# Patient Record
Sex: FEMALE | Race: WHITE | NOT HISPANIC OR LATINO | URBAN - NONMETROPOLITAN AREA
[De-identification: names, ages, dates, MRNs, and addresses within clinical notes are randomized per-mention and may not be internally consistent; named-entity substitution may affect disease eponyms.]

---

## 2017-05-21 ENCOUNTER — HOSPITAL ENCOUNTER (EMERGENCY)
Facility: HOSPITAL | Age: 27
Discharge: LEFT BEFORE DISCHARGE | End: 2017-05-21
Attending: EMERGENCY MEDICINE | Admitting: EMERGENCY MEDICINE

## 2017-05-21 ENCOUNTER — APPOINTMENT (OUTPATIENT)
Dept: GENERAL RADIOLOGY | Facility: HOSPITAL | Age: 27
End: 2017-05-21

## 2017-05-21 VITALS
HEART RATE: 112 BPM | HEIGHT: 63 IN | WEIGHT: 100 LBS | OXYGEN SATURATION: 100 % | DIASTOLIC BLOOD PRESSURE: 81 MMHG | SYSTOLIC BLOOD PRESSURE: 116 MMHG | TEMPERATURE: 97.7 F | BODY MASS INDEX: 17.72 KG/M2 | RESPIRATION RATE: 24 BRPM

## 2017-05-21 DIAGNOSIS — R07.9 CHEST PAIN, UNSPECIFIED TYPE: Primary | ICD-10-CM

## 2017-05-21 LAB
ALBUMIN SERPL-MCNC: 4.6 G/DL (ref 3.5–5)
ALBUMIN/GLOB SERPL: 1.6 G/DL (ref 1.5–2.5)
ALP SERPL-CCNC: 56 U/L (ref 35–104)
ALT SERPL W P-5'-P-CCNC: 15 U/L (ref 10–36)
ANION GAP SERPL CALCULATED.3IONS-SCNC: 9.5 MMOL/L (ref 3.6–11.2)
AST SERPL-CCNC: 24 U/L (ref 10–30)
BASOPHILS # BLD AUTO: 0.03 10*3/MM3 (ref 0–0.3)
BASOPHILS NFR BLD AUTO: 0.6 % (ref 0–2)
BILIRUB SERPL-MCNC: 0.8 MG/DL (ref 0.2–1.8)
BUN BLD-MCNC: 11 MG/DL (ref 7–21)
BUN/CREAT SERPL: 14.3 (ref 7–25)
CALCIUM SPEC-SCNC: 9.5 MG/DL (ref 7.7–10)
CHLORIDE SERPL-SCNC: 109 MMOL/L (ref 99–112)
CO2 SERPL-SCNC: 24.5 MMOL/L (ref 24.3–31.9)
CREAT BLD-MCNC: 0.77 MG/DL (ref 0.43–1.29)
D DIMER PPP FEU-MCNC: 0.51 MG/L (FEU) (ref 0–0.5)
DEPRECATED RDW RBC AUTO: 39.4 FL (ref 37–54)
EOSINOPHIL # BLD AUTO: 0.06 10*3/MM3 (ref 0–0.7)
EOSINOPHIL NFR BLD AUTO: 1.2 % (ref 0–5)
ERYTHROCYTE [DISTWIDTH] IN BLOOD BY AUTOMATED COUNT: 13.4 % (ref 11.5–14.5)
GFR SERPL CREATININE-BSD FRML MDRD: 90 ML/MIN/1.73
GLOBULIN UR ELPH-MCNC: 2.8 GM/DL
GLUCOSE BLD-MCNC: 103 MG/DL (ref 70–110)
HCT VFR BLD AUTO: 38.6 % (ref 37–47)
HGB BLD-MCNC: 13.3 G/DL (ref 12–16)
HOLD SPECIMEN: NORMAL
HOLD SPECIMEN: NORMAL
IMM GRANULOCYTES # BLD: 0.01 10*3/MM3 (ref 0–0.03)
IMM GRANULOCYTES NFR BLD: 0.2 % (ref 0–0.5)
INR PPP: 1.03 (ref 0.8–1.1)
LYMPHOCYTES # BLD AUTO: 1.74 10*3/MM3 (ref 1–3)
LYMPHOCYTES NFR BLD AUTO: 33.5 % (ref 21–51)
MCH RBC QN AUTO: 28.5 PG (ref 27–33)
MCHC RBC AUTO-ENTMCNC: 34.5 G/DL (ref 33–37)
MCV RBC AUTO: 82.8 FL (ref 80–94)
MONOCYTES # BLD AUTO: 0.47 10*3/MM3 (ref 0.1–0.9)
MONOCYTES NFR BLD AUTO: 9.1 % (ref 0–10)
NEUTROPHILS # BLD AUTO: 2.88 10*3/MM3 (ref 1.4–6.5)
NEUTROPHILS NFR BLD AUTO: 55.4 % (ref 30–70)
OSMOLALITY SERPL CALC.SUM OF ELEC: 284.6 MOSM/KG (ref 273–305)
PLATELET # BLD AUTO: 269 10*3/MM3 (ref 130–400)
PMV BLD AUTO: 9.7 FL (ref 6–10)
POTASSIUM BLD-SCNC: 3.6 MMOL/L (ref 3.5–5.3)
PROT SERPL-MCNC: 7.4 G/DL (ref 6–8)
PROTHROMBIN TIME: 11.4 SECONDS (ref 9.8–11.9)
RBC # BLD AUTO: 4.66 10*6/MM3 (ref 4.2–5.4)
SODIUM BLD-SCNC: 143 MMOL/L (ref 135–153)
TROPONIN I SERPL-MCNC: <0.006 NG/ML
WBC NRBC COR # BLD: 5.19 10*3/MM3 (ref 4.5–12.5)
WHOLE BLOOD HOLD SPECIMEN: NORMAL
WHOLE BLOOD HOLD SPECIMEN: NORMAL

## 2017-05-21 PROCEDURE — 80053 COMPREHEN METABOLIC PANEL: CPT | Performed by: PHYSICIAN ASSISTANT

## 2017-05-21 PROCEDURE — 71020 HC CHEST PA AND LATERAL: CPT

## 2017-05-21 PROCEDURE — 84484 ASSAY OF TROPONIN QUANT: CPT | Performed by: PHYSICIAN ASSISTANT

## 2017-05-21 PROCEDURE — 99284 EMERGENCY DEPT VISIT MOD MDM: CPT

## 2017-05-21 PROCEDURE — 85379 FIBRIN DEGRADATION QUANT: CPT | Performed by: PHYSICIAN ASSISTANT

## 2017-05-21 PROCEDURE — 93010 ELECTROCARDIOGRAM REPORT: CPT | Performed by: INTERNAL MEDICINE

## 2017-05-21 PROCEDURE — 85025 COMPLETE CBC W/AUTO DIFF WBC: CPT | Performed by: PHYSICIAN ASSISTANT

## 2017-05-21 PROCEDURE — 71020 XR CHEST 2 VW: CPT | Performed by: RADIOLOGY

## 2017-05-21 PROCEDURE — 85610 PROTHROMBIN TIME: CPT | Performed by: PHYSICIAN ASSISTANT

## 2017-05-21 PROCEDURE — 93005 ELECTROCARDIOGRAM TRACING: CPT | Performed by: PHYSICIAN ASSISTANT

## 2017-05-21 RX ORDER — LORAZEPAM 2 MG/ML
1 INJECTION INTRAMUSCULAR ONCE
Status: DISCONTINUED | OUTPATIENT
Start: 2017-05-21 | End: 2017-05-21 | Stop reason: HOSPADM

## 2017-05-21 RX ORDER — ASPIRIN 325 MG
325 TABLET ORAL ONCE
Status: COMPLETED | OUTPATIENT
Start: 2017-05-21 | End: 2017-05-21

## 2017-05-21 RX ORDER — SODIUM CHLORIDE 0.9 % (FLUSH) 0.9 %
10 SYRINGE (ML) INJECTION AS NEEDED
Status: DISCONTINUED | OUTPATIENT
Start: 2017-05-21 | End: 2017-05-21 | Stop reason: HOSPADM

## 2017-05-21 RX ORDER — PAROXETINE HYDROCHLORIDE 20 MG/1
20 TABLET, FILM COATED ORAL EVERY MORNING
COMMUNITY

## 2017-05-21 RX ADMIN — ASPIRIN 325 MG: 325 TABLET ORAL at 10:53

## 2017-06-08 ENCOUNTER — HOSPITAL ENCOUNTER (EMERGENCY)
Facility: HOSPITAL | Age: 27
Discharge: HOME | End: 2017-06-09
Attending: EMERGENCY MEDICINE | Admitting: EMERGENCY MEDICINE

## 2017-06-08 ENCOUNTER — APPOINTMENT (OUTPATIENT)
Dept: GENERAL RADIOLOGY | Facility: HOSPITAL | Age: 27
End: 2017-06-08

## 2017-06-08 DIAGNOSIS — R07.9 CHEST PAIN, UNSPECIFIED TYPE: Primary | ICD-10-CM

## 2017-06-08 LAB
6-ACETYL MORPHINE: NEGATIVE
ALBUMIN SERPL-MCNC: 4.4 G/DL (ref 3.5–5)
ALBUMIN/GLOB SERPL: 1.5 G/DL (ref 1.5–2.5)
ALP SERPL-CCNC: 56 U/L (ref 35–104)
ALT SERPL W P-5'-P-CCNC: 19 U/L (ref 10–36)
AMPHET+METHAMPHET UR QL: POSITIVE
ANION GAP SERPL CALCULATED.3IONS-SCNC: 4.7 MMOL/L (ref 3.6–11.2)
AST SERPL-CCNC: 23 U/L (ref 10–30)
B-HCG UR QL: NEGATIVE
BACTERIA UR QL AUTO: ABNORMAL /HPF
BARBITURATES UR QL SCN: NEGATIVE
BASOPHILS # BLD AUTO: 0.02 10*3/MM3 (ref 0–0.3)
BASOPHILS NFR BLD AUTO: 0.4 % (ref 0–2)
BENZODIAZ UR QL SCN: NEGATIVE
BILIRUB SERPL-MCNC: 1 MG/DL (ref 0.2–1.8)
BILIRUB UR QL STRIP: NEGATIVE
BUN BLD-MCNC: 12 MG/DL (ref 7–21)
BUN/CREAT SERPL: 13.8 (ref 7–25)
BUPRENORPHINE SERPL-MCNC: NEGATIVE NG/ML
CALCIUM SPEC-SCNC: 9.8 MG/DL (ref 7.7–10)
CANNABINOIDS SERPL QL: NEGATIVE
CHLORIDE SERPL-SCNC: 109 MMOL/L (ref 99–112)
CK MB SERPL-CCNC: 2.29 NG/ML (ref 0–5)
CK MB SERPL-RTO: 1.6 % (ref 0–3)
CK SERPL-CCNC: 145 U/L (ref 24–173)
CLARITY UR: CLEAR
CO2 SERPL-SCNC: 25.3 MMOL/L (ref 24.3–31.9)
COCAINE UR QL: NEGATIVE
COLOR UR: YELLOW
CREAT BLD-MCNC: 0.87 MG/DL (ref 0.43–1.29)
DEPRECATED RDW RBC AUTO: 40.5 FL (ref 37–54)
EOSINOPHIL # BLD AUTO: 0.06 10*3/MM3 (ref 0–0.7)
EOSINOPHIL NFR BLD AUTO: 1.1 % (ref 0–5)
ERYTHROCYTE [DISTWIDTH] IN BLOOD BY AUTOMATED COUNT: 13.3 % (ref 11.5–14.5)
GFR SERPL CREATININE-BSD FRML MDRD: 78 ML/MIN/1.73
GLOBULIN UR ELPH-MCNC: 2.9 GM/DL
GLUCOSE BLD-MCNC: 95 MG/DL (ref 70–110)
GLUCOSE UR STRIP-MCNC: NEGATIVE MG/DL
HCT VFR BLD AUTO: 37.6 % (ref 37–47)
HGB BLD-MCNC: 12.7 G/DL (ref 12–16)
HGB UR QL STRIP.AUTO: ABNORMAL
HYALINE CASTS UR QL AUTO: ABNORMAL /LPF
IMM GRANULOCYTES # BLD: 0 10*3/MM3 (ref 0–0.03)
IMM GRANULOCYTES NFR BLD: 0 % (ref 0–0.5)
KETONES UR QL STRIP: NEGATIVE
LEUKOCYTE ESTERASE UR QL STRIP.AUTO: NEGATIVE
LYMPHOCYTES # BLD AUTO: 1.79 10*3/MM3 (ref 1–3)
LYMPHOCYTES NFR BLD AUTO: 32.4 % (ref 21–51)
MCH RBC QN AUTO: 28.2 PG (ref 27–33)
MCHC RBC AUTO-ENTMCNC: 33.8 G/DL (ref 33–37)
MCV RBC AUTO: 83.6 FL (ref 80–94)
MDMA UR QL SCN: NEGATIVE
METHADONE UR QL SCN: NEGATIVE
MONOCYTES # BLD AUTO: 0.4 10*3/MM3 (ref 0.1–0.9)
MONOCYTES NFR BLD AUTO: 7.2 % (ref 0–10)
MYOGLOBIN SERPL-MCNC: 42 NG/ML (ref 0–109)
NEUTROPHILS # BLD AUTO: 3.25 10*3/MM3 (ref 1.4–6.5)
NEUTROPHILS NFR BLD AUTO: 58.9 % (ref 30–70)
NITRITE UR QL STRIP: NEGATIVE
OPIATES UR QL: NEGATIVE
OSMOLALITY SERPL CALC.SUM OF ELEC: 277.1 MOSM/KG (ref 273–305)
OXYCODONE UR QL SCN: NEGATIVE
PCP UR QL SCN: NEGATIVE
PH UR STRIP.AUTO: 6 [PH] (ref 5–8)
PLATELET # BLD AUTO: 195 10*3/MM3 (ref 130–400)
PMV BLD AUTO: 10.6 FL (ref 6–10)
POTASSIUM BLD-SCNC: 3.2 MMOL/L (ref 3.5–5.3)
PROT SERPL-MCNC: 7.3 G/DL (ref 6–8)
PROT UR QL STRIP: NEGATIVE
RBC # BLD AUTO: 4.5 10*6/MM3 (ref 4.2–5.4)
RBC # UR: ABNORMAL /HPF
REF LAB TEST METHOD: ABNORMAL
SODIUM BLD-SCNC: 139 MMOL/L (ref 135–153)
SP GR UR STRIP: <=1.005 (ref 1–1.03)
SQUAMOUS #/AREA URNS HPF: ABNORMAL /HPF
TROPONIN I SERPL-MCNC: <0.006 NG/ML
UROBILINOGEN UR QL STRIP: ABNORMAL
WBC NRBC COR # BLD: 5.52 10*3/MM3 (ref 4.5–12.5)
WBC UR QL AUTO: ABNORMAL /HPF

## 2017-06-08 PROCEDURE — 80307 DRUG TEST PRSMV CHEM ANLYZR: CPT | Performed by: EMERGENCY MEDICINE

## 2017-06-08 PROCEDURE — 81025 URINE PREGNANCY TEST: CPT | Performed by: EMERGENCY MEDICINE

## 2017-06-08 PROCEDURE — 96361 HYDRATE IV INFUSION ADD-ON: CPT

## 2017-06-08 PROCEDURE — 93010 ELECTROCARDIOGRAM REPORT: CPT | Performed by: INTERNAL MEDICINE

## 2017-06-08 PROCEDURE — 82550 ASSAY OF CK (CPK): CPT | Performed by: EMERGENCY MEDICINE

## 2017-06-08 PROCEDURE — 85025 COMPLETE CBC W/AUTO DIFF WBC: CPT | Performed by: EMERGENCY MEDICINE

## 2017-06-08 PROCEDURE — 82553 CREATINE MB FRACTION: CPT | Performed by: EMERGENCY MEDICINE

## 2017-06-08 PROCEDURE — 80053 COMPREHEN METABOLIC PANEL: CPT | Performed by: EMERGENCY MEDICINE

## 2017-06-08 PROCEDURE — 25010000002 DIPHENHYDRAMINE PER 50 MG: Performed by: EMERGENCY MEDICINE

## 2017-06-08 PROCEDURE — 71020 XR CHEST 2 VW: CPT | Performed by: RADIOLOGY

## 2017-06-08 PROCEDURE — 81001 URINALYSIS AUTO W/SCOPE: CPT | Performed by: EMERGENCY MEDICINE

## 2017-06-08 PROCEDURE — 96374 THER/PROPH/DIAG INJ IV PUSH: CPT

## 2017-06-08 PROCEDURE — 99284 EMERGENCY DEPT VISIT MOD MDM: CPT

## 2017-06-08 PROCEDURE — 83874 ASSAY OF MYOGLOBIN: CPT | Performed by: EMERGENCY MEDICINE

## 2017-06-08 PROCEDURE — 71020 HC CHEST PA AND LATERAL: CPT

## 2017-06-08 PROCEDURE — 84484 ASSAY OF TROPONIN QUANT: CPT | Performed by: EMERGENCY MEDICINE

## 2017-06-08 PROCEDURE — 93005 ELECTROCARDIOGRAM TRACING: CPT | Performed by: EMERGENCY MEDICINE

## 2017-06-08 RX ORDER — DIPHENHYDRAMINE HYDROCHLORIDE 50 MG/ML
25 INJECTION INTRAMUSCULAR; INTRAVENOUS ONCE
Status: COMPLETED | OUTPATIENT
Start: 2017-06-08 | End: 2017-06-08

## 2017-06-08 RX ORDER — BUSPIRONE HYDROCHLORIDE 5 MG/1
5 TABLET ORAL 2 TIMES DAILY
COMMUNITY

## 2017-06-08 RX ORDER — SODIUM CHLORIDE 0.9 % (FLUSH) 0.9 %
10 SYRINGE (ML) INJECTION AS NEEDED
Status: DISCONTINUED | OUTPATIENT
Start: 2017-06-08 | End: 2017-06-09 | Stop reason: HOSPADM

## 2017-06-08 RX ORDER — SODIUM CHLORIDE 9 MG/ML
125 INJECTION, SOLUTION INTRAVENOUS CONTINUOUS
Status: DISCONTINUED | OUTPATIENT
Start: 2017-06-08 | End: 2017-06-09 | Stop reason: HOSPADM

## 2017-06-08 RX ADMIN — SODIUM CHLORIDE 125 ML/HR: 9 INJECTION, SOLUTION INTRAVENOUS at 23:26

## 2017-06-08 RX ADMIN — SODIUM CHLORIDE 500 ML: 9 INJECTION, SOLUTION INTRAVENOUS at 21:40

## 2017-06-08 RX ADMIN — SODIUM CHLORIDE 500 ML: 9 INJECTION, SOLUTION INTRAVENOUS at 22:41

## 2017-06-08 RX ADMIN — DIPHENHYDRAMINE HYDROCHLORIDE 25 MG: 50 INJECTION INTRAMUSCULAR; INTRAVENOUS at 22:42

## 2017-06-09 VITALS
HEART RATE: 88 BPM | WEIGHT: 100 LBS | HEIGHT: 63 IN | RESPIRATION RATE: 16 BRPM | OXYGEN SATURATION: 99 % | BODY MASS INDEX: 17.72 KG/M2 | DIASTOLIC BLOOD PRESSURE: 77 MMHG | TEMPERATURE: 97.8 F | SYSTOLIC BLOOD PRESSURE: 114 MMHG

## 2017-06-09 LAB
CK MB SERPL-CCNC: 2.26 NG/ML (ref 0–5)
CK MB SERPL-RTO: 1.8 % (ref 0–3)
CK SERPL-CCNC: 124 U/L (ref 24–173)
MYOGLOBIN SERPL-MCNC: 29 NG/ML (ref 0–109)
TROPONIN I SERPL-MCNC: <0.006 NG/ML

## 2017-06-09 RX ADMIN — SODIUM CHLORIDE 1000 ML: 9 INJECTION, SOLUTION INTRAVENOUS at 00:02

## 2017-06-09 NOTE — ED NOTES
Pt states she earlier today her left arm was tingling as well as her lower legs bilat.   Pt states it is gone now.      Zoe Prescott RN  06/08/17 8509

## 2017-06-09 NOTE — ED PROVIDER NOTES
Subjective   HPI Comments: Pt comes in with concern for dehydration and possible stroke.  Pt reports she has been fatigued and has not been drinking enough water.  Pt reports she had left arm and chest tingling earlier.  Now almost gone.    Patient is a 26 y.o. female presenting with chest pain.   History provided by:  Patient  Chest Pain   Pain location:  Unable to specify  Pain quality comment:  Tingling  Pain radiates to:  L arm  Onset quality:  Sudden  Progression:  Resolved  Chronicity:  New  Context: not breathing, not drug use, not eating, not intercourse, not lifting, not movement, not raising an arm, not at rest and not trauma    Relieved by:  Nothing  Worsened by:  Nothing  Ineffective treatments:  None tried  Associated symptoms: anxiety, fatigue, numbness and weakness    Associated symptoms: no abdominal pain, no AICD problem, no anorexia, no back pain, no claudication, no cough, no diaphoresis, no dizziness, no dysphagia, no fever, no headache, no heartburn, no lower extremity edema, no nausea, no near-syncope, no orthopnea, no palpitations, no PND, no shortness of breath, no syncope and no vomiting    Risk factors: smoking    Risk factors: no aortic disease, no coronary artery disease, no diabetes mellitus, no Lupe-Danlos syndrome, no high cholesterol, no hypertension, no immobilization, not male, no Marfan's syndrome, not obese, not pregnant, no prior DVT/PE and no surgery        Review of Systems   Constitutional: Positive for fatigue. Negative for diaphoresis and fever.   HENT: Negative.  Negative for trouble swallowing.    Eyes: Negative.    Respiratory: Positive for chest tightness. Negative for cough and shortness of breath.    Cardiovascular: Positive for chest pain. Negative for palpitations, orthopnea, claudication, syncope, PND and near-syncope.   Gastrointestinal: Negative.  Negative for abdominal pain, anorexia, heartburn, nausea and vomiting.   Endocrine: Negative.    Genitourinary:  Positive for difficulty urinating.   Musculoskeletal: Negative.  Negative for back pain.   Skin: Negative.    Allergic/Immunologic: Negative.    Neurological: Positive for weakness and numbness. Negative for dizziness and headaches.   Hematological: Negative.    Psychiatric/Behavioral: The patient is nervous/anxious.    All other systems reviewed and are negative.      Past Medical History:   Diagnosis Date   • Depression        No Known Allergies    History reviewed. No pertinent surgical history.    History reviewed. No pertinent family history.    Social History     Social History   • Marital status:      Spouse name: N/A   • Number of children: N/A   • Years of education: N/A     Social History Main Topics   • Smoking status: Current Every Day Smoker     Packs/day: 2.00     Types: Cigarettes   • Smokeless tobacco: None   • Alcohol use No   • Drug use: No   • Sexual activity: Not Asked     Other Topics Concern   • None     Social History Narrative           Objective   Physical Exam   Constitutional: She is oriented to person, place, and time. She appears well-developed and well-nourished. No distress.   HENT:   Head: Normocephalic and atraumatic.   Nose: Nose normal.   Mouth/Throat: Oropharynx is clear and moist.   Eyes: Conjunctivae and EOM are normal. Pupils are equal, round, and reactive to light. Right eye exhibits no discharge. Left eye exhibits no discharge. No scleral icterus.   Neck: Normal range of motion. No JVD present. No tracheal deviation present. No thyromegaly present.   Cardiovascular: Normal rate, regular rhythm, normal heart sounds and intact distal pulses.  Exam reveals no gallop and no friction rub.    No murmur heard.  Pulmonary/Chest: Effort normal and breath sounds normal. No stridor. No respiratory distress. She has no wheezes. She has no rales. She exhibits no tenderness.   Abdominal: Soft. She exhibits no distension and no mass. There is no tenderness. There is no guarding.    Musculoskeletal: Normal range of motion. She exhibits no edema, tenderness or deformity.   Lymphadenopathy:     She has no cervical adenopathy.   Neurological: She is alert and oriented to person, place, and time. No cranial nerve deficit. She exhibits normal muscle tone. Coordination normal.   Skin: Skin is warm and dry. No rash noted. No pallor.   Psychiatric:   Anxious   Nursing note and vitals reviewed.      Procedures         ED Course  ED Course   Value Comment By Time   ECG 12 Lead 12 Lead EKG performed at 2113.  Interpreted by myself at 2114.  NSR 90bpm.  IVET 98.  QRS 74.  QTc 455.  No pathologic blocks.  No sustained dysrhythmia.  No acute ischemic ST segment deviation.  No Criteria for STEMI Larry Howell MD 06/08 2114            HEART Score  History: Slightly suspicious (+0)  ECG: Normal (+0)  Age: Less than 45 (+0)  Risk Factors: 1 - 2 risk factors (+1)  Troponin: Normal limit or lower (+0)  Total: 1       XR Chest 2 View   ED Interpretation   PA & lateral CXR   My read   NAD        Labs Reviewed   COMPREHENSIVE METABOLIC PANEL - Abnormal; Notable for the following:        Result Value    Potassium 3.2 (*)     All other components within normal limits   URINALYSIS W/ CULTURE IF INDICATED - Abnormal; Notable for the following:     Blood, UA Moderate (2+) (*)     All other components within normal limits   URINE DRUG SCREEN - Abnormal; Notable for the following:     Amphetamine Screen, Urine Positive (*)     All other components within normal limits    Narrative:     Negative Thresholds For Drugs Screened:                  Amphetamines              1000 ng/ml               Barbiturates               200 ng/ml               Benzodiazepines            200 ng/ml              Cocaine                    300 ng/ml              Methadone                  300 ng/ml              Opiates                    300 ng/ml               Phencyclidine               25 ng/ml               THC                          50 ng/ml              6-Acetyl Morphine           10 ng/ml              Buprenorphine                5 ng/ml              Ecstasy                    300 ng/ml              Oxycodone                  300 ng/ml    The reference range for all drugs tested is negative. This report includes final unconfirmed qualitative results to be used for medical treatment purposes only. Unconfirmed results must not be used for non-medical purposes such as employment or legal testing. Clinical consideration should be applied to any drug of abuse test, especially when unconfirmed quantitative results are used.     CBC WITH AUTO DIFFERENTIAL - Abnormal; Notable for the following:     MPV 10.6 (*)     All other components within normal limits   URINALYSIS, MICROSCOPIC ONLY - Abnormal; Notable for the following:     RBC, UA 0-2 (*)     WBC, UA 0-2 (*)     Bacteria, UA Trace (*)     All other components within normal limits   PREGNANCY, URINE - Normal    Narrative:     Diluted specimens may cause false negative results.   CK - Normal   MYOGLOBIN, SERUM - Normal   CK MB - Normal   TROPONIN (IN-HOUSE) - Normal    Narrative:     Ultra Troponin I Reference Range:         <=0.039 ng/mL: Negative    0.04-0.779 ng/mL: Indeterminate Range. Suspicious of MI.  Clinical correlation required.       >=0.78  ng/mL: Consistent with myocardial injury.  Clinical correlation required.   OSMOLALITY, CALCULATED - Normal   CKMB INDEX CALCULATION - Normal   CK - Normal   MYOGLOBIN, SERUM - Normal   CK MB - Normal   TROPONIN (IN-HOUSE) - Normal    Narrative:     Ultra Troponin I Reference Range:         <=0.039 ng/mL: Negative    0.04-0.779 ng/mL: Indeterminate Range. Suspicious of MI.  Clinical correlation required.       >=0.78  ng/mL: Consistent with myocardial injury.  Clinical correlation required.   CKMB INDEX CALCULATION - Normal   CBC AND DIFFERENTIAL    Narrative:     The following orders were created for panel order CBC & Differential.  Procedure                                Abnormality         Status                     ---------                               -----------         ------                     CBC Auto Differential[[id number]]        Abnormal            Final result                 Please view results for these tests on the individual orders.        Medication List      CONTINUE taking these medications          busPIRone 5 MG tablet   Commonly known as:  BUSPAR       PARoxetine 20 MG tablet   Commonly known as:  PAXIL             MDM  Number of Diagnoses or Management Options  Chest pain, unspecified type: new and requires workup     Amount and/or Complexity of Data Reviewed  Clinical lab tests: ordered and reviewed  Tests in the radiology section of CPT®: ordered and reviewed  Independent visualization of images, tracings, or specimens: yes    Risk of Complications, Morbidity, and/or Mortality  Presenting problems: moderate  Diagnostic procedures: high  Management options: moderate    Patient Progress  Patient progress: stable      Final diagnoses:   Chest pain, unspecified type            Larry Howell MD  06/09/17 0600

## 2017-11-20 ENCOUNTER — HOSPITAL ENCOUNTER (EMERGENCY)
Facility: HOSPITAL | Age: 27
Discharge: HOME | End: 2017-11-20
Attending: FAMILY MEDICINE | Admitting: FAMILY MEDICINE

## 2017-11-20 VITALS
HEART RATE: 73 BPM | DIASTOLIC BLOOD PRESSURE: 75 MMHG | BODY MASS INDEX: 16.48 KG/M2 | OXYGEN SATURATION: 98 % | HEIGHT: 63 IN | SYSTOLIC BLOOD PRESSURE: 123 MMHG | TEMPERATURE: 97 F | RESPIRATION RATE: 18 BRPM | WEIGHT: 93 LBS

## 2017-11-20 DIAGNOSIS — R21 GENERALIZED RASH: Primary | ICD-10-CM

## 2017-11-20 PROCEDURE — 99282 EMERGENCY DEPT VISIT SF MDM: CPT

## 2017-11-20 RX ORDER — DIAPER,BRIEF,INFANT-TODD,DISP
EACH MISCELLANEOUS EVERY 6 HOURS PRN
Qty: 15 G | Refills: 0 | Status: SHIPPED | OUTPATIENT
Start: 2017-11-20

## 2017-11-20 RX ORDER — SULFAMETHOXAZOLE AND TRIMETHOPRIM 800; 160 MG/1; MG/1
1 TABLET ORAL 2 TIMES DAILY
Qty: 14 TABLET | Refills: 0 | Status: SHIPPED | OUTPATIENT
Start: 2017-11-20 | End: 2017-11-27

## 2017-11-21 NOTE — ED NOTES
Pt states that a couple of weeks ago she noticed a red spot on her chest, states since then she has noticed other spots that look possibly like bug bites all over her body. States that some of the spots look red, sometimes they look black, and sometimes the spots seem to move. Pt states she has looked it up and thinks it might be a fungal infection, states she wants to have it treated before it becomes systemic     Betsy Zacarias RN  11/20/17 9229

## 2017-11-21 NOTE — ED PROVIDER NOTES
Subjective   Patient is a 26 y.o. female presenting with rash.   History provided by:  Patient   used: No    Rash   Location:  Full body  Quality: itchiness    Severity:  Mild  Onset quality:  Sudden  Duration:  2 weeks  Timing:  Constant  Progression:  Worsening  Chronicity:  New  Relieved by:  Nothing  Worsened by:  Nothing  Ineffective treatments:  None tried      Review of Systems   Constitutional: Negative.    HENT: Negative.    Eyes: Negative.    Respiratory: Negative.    Cardiovascular: Negative.    Gastrointestinal: Negative.    Endocrine: Negative.    Genitourinary: Negative.    Musculoskeletal: Negative.    Skin: Positive for rash.   Allergic/Immunologic: Negative.    Neurological: Negative.    Hematological: Negative.    Psychiatric/Behavioral: Negative.    All other systems reviewed and are negative.      Past Medical History:   Diagnosis Date   • Depression        No Known Allergies    History reviewed. No pertinent surgical history.    History reviewed. No pertinent family history.    Social History     Social History   • Marital status:      Spouse name: N/A   • Number of children: N/A   • Years of education: N/A     Social History Main Topics   • Smoking status: Current Every Day Smoker     Packs/day: 2.00     Types: Cigarettes   • Smokeless tobacco: Never Used   • Alcohol use No   • Drug use: No   • Sexual activity: Defer     Other Topics Concern   • None     Social History Narrative   • None           Objective   Physical Exam   Constitutional: She is oriented to person, place, and time. She appears well-developed and well-nourished.   HENT:   Head: Normocephalic and atraumatic.   Right Ear: External ear normal.   Left Ear: External ear normal.   Nose: Nose normal.   Mouth/Throat: Oropharynx is clear and moist.   Eyes: Conjunctivae and EOM are normal. Pupils are equal, round, and reactive to light.   Neck: Normal range of motion. Neck supple.   Cardiovascular: Normal  rate, regular rhythm, normal heart sounds and intact distal pulses.    Pulmonary/Chest: Effort normal and breath sounds normal.   Abdominal: Soft. Bowel sounds are normal.   Musculoskeletal: Normal range of motion.   Neurological: She is alert and oriented to person, place, and time.   Skin: Skin is warm and dry. Rash noted.   Psychiatric: She has a normal mood and affect. Her behavior is normal. Judgment and thought content normal.   Nursing note and vitals reviewed.      Procedures         ED Course  ED Course                  MDM    Final diagnoses:   Generalized rash            LATA Callahan  11/20/17 4145

## 2021-03-18 ENCOUNTER — BULK ORDERING (OUTPATIENT)
Dept: CASE MANAGEMENT | Facility: OTHER | Age: 31
End: 2021-03-18

## 2021-03-18 DIAGNOSIS — Z23 IMMUNIZATION DUE: ICD-10-CM
